# Patient Record
(demographics unavailable — no encounter records)

---

## 2025-03-19 NOTE — REASON FOR VISIT
[Initial Consultation] : an initial consultation for [Behavior Problems] : behavior problems [Attention Problems] : attention problems [Other: ____] : [unfilled] [Mother] : mother [Rating scales] : rating scales [Learning Problems] : learning problems [IEP] : IEP [Father] : father [FreeTextEntry2] : Mother has concerns. He  used to have academic difficulties but now that he is the 12:1:2 c;ass, it has  improved. Now it is more behaviors.  He gets upset and frustrated. He has difficulty regulating his emotions and could kick things. He is very forgetful and does not remember when her kept his toys. In pre-k he was evaluated for ot and he started the ot. He started IEP at the end of Kindergarted. then 1st grade he went to [employer] BUT IN THE Second grade, he was changed to the 12:1:1 class becsue he performned better in small groups. He has been doing better since then.

## 2025-03-19 NOTE — HISTORY OF PRESENT ILLNESS
[Public] : Public [Other: _____] : [unfilled] [IEP] : Individualized Education Program [OT: ____] : Occupational Therapy [unfilled] [S-L: _____] : Speech/Language Therapy [unfilled] [Counseling: _____] : Counseling [unfilled] [Difficulty focusing in class] : difficulty focusing in class [Needs frequent redirection to finish tasks] : needs frequent redirection to finish tasks [Difficulty completing homework, needs supervision] : difficulty completing homework, needs supervision [Difficulty following the daily routines at home] : difficulty following the daily routines at home [Instructions often need to be repeated several times] : instructions often need to be repeated several times [Often loses belongings, misplaces books/assignments] : often loses belongings, misplaces books and/or assignments [Backpack is a mess] : backpack is a mess [Difficulty sitting still in class] : difficulty sitting still in class [Restless, fidgety] : restless, fidgety [Often playing with objects in hands] : often playing with objects in hands [Disruptive in class] : disruptive in class [Calls out in class, doesn't raise hand] : calls out in class, doesn't raise hand [Impatient, has trouble waiting for turn] : impatient, has trouble waiting for turn [Easily frustrated] : easily frustrated [Reacts physically when upset] : reacts physically when upset [Difficulty with transitions] : difficulty with transitions [Has frequent temper tantrums] : has frequent temper tantrums [No delays, but not working to potential] : no delays, but not working to potential [Difficulty with reading] : difficulty with reading [Handwriting is sloppy or illegible] : handwriting is sloppy or illegible [Gets along well with other children] : gets along well with other children [Is very sensitive, upset easily] : is very sensitive, upset easily [Johnson] : johnson [Delays in motor skills] : delays in motor skills [Poor handwriting] : poor handwriting [Difficulty with sleep] : difficulty with sleep [Picky eater, eats a limited range of food] : picky eater, eats a very limited range of food [Plays with a variety of toys] :  plays with a variety of toys [Often seeks activity] : often seeks activity [Difficulty with Toilet training] : difficulty with toilet training [Easily distracted] : not easily distracted [Runs Off] : does not run off [Disrespectful, talks back to adults] : not disrespectful, does not talk back to adults [Difficult to discipline] : not difficult to discipline [Has hit other children] : has not hit other children [Physically aggressive] : not physically aggressive [Doesn't stay with the group during [place] time] : does stays with the group during [place] time [Difficulty with math] : no difficulties with math [Reads well, but has trouble with comprehension] : reads well and comprehends [Seems sad often] : does not seem sad often [Difficulty  from parents] : no difficulty  from parents [Seems nervous] : does not seem nervous [Worries about school performance] : does not worry about school performance [Worries what other children think] : does not worry about what other children think [Afraid to speak in front of class] : is not afraid to speak in front of class [Has many fears] : does not have many fears [Delayed Speech] : no delayed speech [Trouble tying shoelaces] : no trouble tying shoelaces [Flaps hands] : does not flap hands [Becomes fixated on specific topics or interests for a period of time] : does not become fixated on specific topics or interest for a period of time [Gets upset with loud sounds] : does not get upset with loud sounds [Sensitive to texture, only wear certain clothes] : not sensitive to texture, will not only wear certain clothes [Difficulty with bathing] : no difficulties with bathing [difficulty with brushing teeth] : no difficulties with brushing teeth [Difficulty with haircuts] :  no difficulties with haircuts [FreeTextEntry3] : Sometimes his behaviors are disruptive in the class. [FreeTextEntry5] : Sometimes he has behavior difficulties at school and at home but not often. [FreeTextEntry6] : His reading is improving. [de-identified] : His fine motor skills especially handwriting is improving with Occupational therapy. [de-identified] : His mother offers him melatonin and it helps with sleep improvement. [de-identified] : He used to eat more variety of foods in the past but he is currently very picky [de-identified] : He would become upset with loud sounds in the past but not anymore. [de-identified] : He was toilet trained at 3 years of age. [FreeTextEntry4] : FATIMAH Snow [TWNoteComboBox1] : 2nd Grade

## 2025-03-19 NOTE — PAST MEDICAL HISTORY
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

## 2025-03-19 NOTE — SOCIAL HISTORY
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

## 2025-03-19 NOTE — BIRTH HISTORY
[At ___ Weeks Gestation] : at [unfilled] weeks gestation [ Section] : by  section [None] : there were no delivery complications [FreeTextEntry1] : 7 pounds 12 ounces

## 2025-04-14 NOTE — REASON FOR VISIT
[Initial Consult - Subsequent Visit] : an initial consultation subsequent visit for [Learning Problems] : learning problems [Parents] : parents [Behavior Problems] : behavior problems [Hyperactivity] : hyperactivity [Attention Problems] : attention problems [Other: ____] : [unfilled] [FreeTextEntry2] : Destin's parents returned with him to discuss our findings and recommendations following his initial evaluation. Destin's mother has concerns regarding his inattention difficulties and other behavior problems that impact his functioning. He has difficulty regulating his emotions as he often tends to display anger and frustration over trivial situations. He displays temper tantrums when he cannot have his way. He kicks things and throws objects. He is very forgetful and does not remember where he kept his toys at times. Destin does not like to write and would become angry when he is required to write. it was reported that rips his writing assignment papers in the class at times when he is angry. Destin has difficulty focusing in the class and needs frequent redirection to complete classroom tasks. According to the completed checklists, he does not often pay close attention to details of tasks and tends to make careless mistakes. Sometimes he does not seem to listen when spoken to directly and has difficulty following through on instructions. he also has difficulty organizing his work and activities and tends to avoid tasks that require a lot of mental effort. He is forgetful in daily activities, and easily distracted by other things going on in his immediate environment. Destin is fidgety, and has difficulty remaining seated even when asked to do so. Sometimes he talks excessively and has difficulty playing quietly. He has difficulty awaiting turn in group activities and tends to interrupt the activities of his peers at times. Destin loses temper very easily and argues with adults. He displays defiance at times and tends to blame others for his own misbehaviors or mistakes. On a separate narrative from his teacher, Destin is respectful to the teacher but disregards directions from his paraprofessionals or related service providers. He can develop friendships and interests but will also express his dislike of that person or interest with comments sometimes. He likes to participate in the class and enjoys classroom roles. However, he struggles to complete a task or maintain routines. Destin's mother believes that his academic performance is improving with the change in his classroom placement to a 12:1:1 class. His reading skills and comprehension are also improving. While Destin was in Pre-k, he was evaluated for occupational therapy and was offered services. His IEP was initiated while he was in the  class and the ICT classroom placement started in 1st grade. His recent classroom change to a 12:1:1 class was initiated because he tends to perform well when placed in a small group. Although Destin has inattention difficulties, his mother has greater concerns regarding his behaviors at school and at home because his behaviors tend to be disruptive in the class. She is seeking evaluation and recommendations for interventions that can help with the improvement of his inattention difficulties and the control of his behaviors. PLAN: Destin displays significant symptoms, and meets the criteria for a diagnosis of Attention Deficit Hyperactivity Disorder (ADHD) - combined presentation. He will continue with his current IEP and other educational services. He will benefit from the testing accommodations of extra time and separate location for testing. A preferential classroom seating will be needed to minimize classroom distractions. Both the school based and non school based counseling/therapy services will be needed for behavior modification, and to enable him learn coping skills during periods of frustration. He will need positive reinforcement and privileges for desired behavior at home and removing desired activity because of inappropriate behaviors. Destin's mother indicated that plans are in progress towards an assistive technology speech to type device for him considering his writing difficulties. She will return with him in 6-8 months to discuss his academic progress.

## 2025-04-14 NOTE — PLAN
[Continue IEP with modifications: _____] : - Continue services as presently provided for in the Individualized Education Program, but with the following modifications: [unfilled] [IEP Accomm. & Testing Modifications: ____] : - The IEP should  include accommodations and testing modifications. The plan should provide, at a minimum, for extended time for testing, and the opportunity to take or finish tests in a quiet, separate location. Additional accommodations recommended for this child are:  [unfilled] [ADHD EDU/Behav. Strategies (Gen)] : - Those educational and behavioral strategies known to be helpful to children with ADHD should be implemented in the classroom. [Instruction in Executive Function Skills] : - Direct, individualized instruction in executive function-related skills: i.e. task analysis, planning, organization, study strategies, memorization [Monitor Attention] : - [unfilled]'s attention skills will need to continue to be monitored [Intensive Reading Instruction] : - Intensive reading instruction [Speech/Language] : - Speech and language therapy  [Occupational Therapy] : - Occupational therapy [Individual In-School Counseling] : - Individual counseling weekly with school psychologist or  [Cessation of screen use before bedtime] : - Ensure cessation of video screen use one hour before bedtime [Limit Screen Time] : - Limit screen time [Understanding ADHD] : - Understanding ADHD by the American Academy of Pediatrics [roni.org] : - roni.org - Children and Adults with Attention Deficit Hyperactivity Disorder [Follow-up visit (re-evaluation): _____] : - Follow-up visit in [unfilled]  for re-evaluation. [Counseling] : Benefits and limits of counseling or therapy [Family Questions] : Family's questions were addressed [Sleep] : The importance of sleep and strategies to ensure adequate sleep [Media / Screen Time] : Importance of limiting electronics, media, and screen time [Accuracy] : Accuracy and reliability of clinical impressions [Findings (To Date)] : Findings from evaluation (to date) [Clinical Basis] : Clinical basis for current diagnosis and clinical impressions

## 2025-04-14 NOTE — HISTORY OF PRESENT ILLNESS
[Difficulty focusing in class] : difficulty focusing in class [Needs frequent redirection to finish tasks] : needs frequent redirection to finish tasks [Difficulty completing homework, needs supervision] : difficulty completing homework, needs supervision [Difficulty following the daily routines at home] : difficulty following the daily routines at home [Instructions often need to be repeated several times] : instructions often need to be repeated several times [Often loses belongings, misplaces books/assignments] : often loses belongings, misplaces books and/or assignments [Backpack is a mess] : backpack is a mess [Difficulty sitting still in class] : difficulty sitting still in class [Restless, fidgety] : restless, fidgety [Often playing with objects in hands] : often playing with objects in hands [Disruptive in class] : disruptive in class [Calls out in class, doesn't raise hand] : calls out in class, doesn't raise hand [Impatient, has trouble waiting for turn] : impatient, has trouble waiting for turn [Easily frustrated] : easily frustrated [Reacts physically when upset] : reacts physically when upset [Difficulty with transitions] : difficulty with transitions [Has frequent temper tantrums] : has frequent temper tantrums [No delays, but not working to potential] : no delays, but not working to potential [Difficulty with reading] : difficulty with reading [Handwriting is sloppy or illegible] : handwriting is sloppy or illegible [Gets along well with other children] : gets along well with other children [Is very sensitive, upset easily] : is very sensitive, upset easily [Johnson] : johnson [Delays in motor skills] : delays in motor skills [Poor handwriting] : poor handwriting [Difficulty with sleep] : difficulty with sleep [Picky eater, eats a limited range of food] : picky eater, eats a very limited range of food [Plays with a variety of toys] :  plays with a variety of toys [Often seeks activity] : often seeks activity [Difficulty with Toilet training] : difficulty with toilet training [Public] : Public [Other: _____] : [unfilled] [IEP] : Individualized Education Program [OT: ____] : Occupational Therapy [unfilled] [S-L: _____] : Speech/Language Therapy [unfilled] [Counseling: _____] : Counseling [unfilled] [Easily distracted] : not easily distracted [Runs Off] : does not run off [Disrespectful, talks back to adults] : not disrespectful, does not talk back to adults [Difficult to discipline] : not difficult to discipline [Has hit other children] : has not hit other children [Physically aggressive] : not physically aggressive [Doesn't stay with the group during [place] time] : does stays with the group during [place] time [Difficulty with math] : no difficulties with math [Reads well, but has trouble with comprehension] : reads well and comprehends [Seems sad often] : does not seem sad often [Difficulty  from parents] : no difficulty  from parents [Seems nervous] : does not seem nervous [Worries about school performance] : does not worry about school performance [Worries what other children think] : does not worry about what other children think [Afraid to speak in front of class] : is not afraid to speak in front of class [Has many fears] : does not have many fears [Delayed Speech] : no delayed speech [Trouble tying shoelaces] : no trouble tying shoelaces [Flaps hands] : does not flap hands [Becomes fixated on specific topics or interests for a period of time] : does not become fixated on specific topics or interest for a period of time [Gets upset with loud sounds] : does not get upset with loud sounds [Sensitive to texture, only wear certain clothes] : not sensitive to texture, will not only wear certain clothes [Difficulty with bathing] : no difficulties with bathing [difficulty with brushing teeth] : no difficulties with brushing teeth [Difficulty with haircuts] :  no difficulties with haircuts [FreeTextEntry3] : Sometimes his behaviors are disruptive in the class. [FreeTextEntry5] : Sometimes he has behavior difficulties at school and at home but not often. [FreeTextEntry6] : His reading is improving. [de-identified] : His fine motor skills especially handwriting is improving with Occupational therapy. [de-identified] : His mother offers him melatonin and it helps with sleep improvement. [de-identified] : He used to eat more variety of foods in the past but he is currently very picky [de-identified] : He would become upset with loud sounds in the past but not anymore. [de-identified] : He was toilet trained at 3 years of age. [FreeTextEntry4] : FATIMAH Snow [TWNoteComboBox1] : 2nd Grade